# Patient Record
Sex: MALE | Race: WHITE | NOT HISPANIC OR LATINO | URBAN - METROPOLITAN AREA
[De-identification: names, ages, dates, MRNs, and addresses within clinical notes are randomized per-mention and may not be internally consistent; named-entity substitution may affect disease eponyms.]

---

## 2020-05-07 ENCOUNTER — OFFICE VISIT - HEALTHEAST (OUTPATIENT)
Dept: FAMILY MEDICINE | Facility: CLINIC | Age: 30
End: 2020-05-07

## 2020-05-07 DIAGNOSIS — F17.200 NICOTINE DEPENDENCE, UNCOMPLICATED, UNSPECIFIED NICOTINE PRODUCT TYPE: ICD-10-CM

## 2020-05-07 DIAGNOSIS — J02.9 ACUTE PHARYNGITIS, UNSPECIFIED ETIOLOGY: ICD-10-CM

## 2020-05-07 DIAGNOSIS — K08.89 TOOTH PAIN: ICD-10-CM

## 2020-05-07 RX ORDER — VARENICLINE TARTRATE 1 MG/1
TABLET, FILM COATED ORAL
Qty: 60 TABLET | Refills: 2 | Status: SHIPPED | OUTPATIENT
Start: 2020-05-07

## 2021-06-08 NOTE — PROGRESS NOTES
"Trenton Elizabeth is a 30 y.o. male who is being evaluated via a billable telephone visit.      The patient has been notified of following:     \"This telephone visit will be conducted via a call between you and your physician/provider. We have found that certain health care needs can be provided without the need for a physical exam.  This service lets us provide the care you need with a short phone conversation.  If a prescription is necessary we can send it directly to your pharmacy.  If lab work is needed we can place an order for that and you can then stop by our lab to have the test done at a later time.    Telephone visits are billed at different rates depending on your insurance coverage. During this emergency period, for some insurers they may be billed the same as an in-person visit.  Please reach out to your insurance provider with any questions.    If during the course of the call the physician/provider feels a telephone visit is not appropriate, you will not be charged for this service.\"    Patient has given verbal consent to a Telephone visit? Yes    What phone number would you like to be contacted at? [phone]    Patient would like to receive their AVS by AVS Preference: Mail a copy.    Additional provider notes:   Trenton Elizabeth is a 30 y.o. male who presents with chief complaint of swollen glands.  Bilateral neck swollen glands for 2.5 days.  Getting worse?  Had 2 broken teeth, one on each side.  Only one side painful at the moment.  No obvious facial swelling.  Hurts to chew on side of pain.  Cold food/liquids cause pain.  No fevers, no known sick exposures.  He is a smoker, interested in quitting.  Overall toothache for 6 months, getting worse, has not been able to get in to see a dentist.  He can feel his throat glands are swollen when he touches them, can also feel the swelling when he turns his head or moves his neck.  Glands \"feel huge.\"  No trouble swallowing foods.  No current medical " problems, no current prescription medications.  Taking ibuprofen with food, for pain.  History of tonsillectomy.    General: Patient is alert and oriented x 3, in no apparent distress  ENT: normal speaking voice, no hoarseness or muffled sounding voice  Respiratory: no audible wheezing, speaking in normal sentences  Neuro: normal mentation, answering questions appropriately    Assessment/Plan:  1. Acute pharyngitis, unspecified etiology  Differential includes viral pharyngitis, Strep pharyngitis, adenopathy due to tooth infection/issues, versus other.  Discussed treatment options with patient.  No antibiotics in over 2 months.  Prescription sent for Augmentin, which should cover tooth abscess and Strep pharyngitis.  Will continue with symptomatic treatment.  Follow-up as needed.  - amoxicillin-clavulanate (AUGMENTIN) 875-125 mg per tablet; Take 1 tablet by mouth 2 (two) times a day for 10 days.  Dispense: 20 tablet; Refill: 0     2. Tooth pain  Appears to be due to cavity/broken tooth with possible abscess.  Continue ibuprofen with food.  Antibiotic prescribes as above.  Encouraged to follow up with dentist when Covid 19 restrictions lifted.     3. Nicotine dependence, uncomplicated, unspecified nicotine product type  3 minutes spent discussing smoking cessation.  Interested in trying Chantix.  Discussed appropriate use.  If co-pay is too high, he will let us know.  Follow-up as needed.  - varenicline (CHANTIX STARTING MONTH BOX) 0.5 mg (11)- 1 mg (42) tablet; 1 wk before you stop smoking take 0.5mg daily on days 1-3, 0.5mg 2 times each day on days 4-7, then 1mg 2 times daily.  Dispense: 53 tablet; Refill: 0  - varenicline (CHANTIX) 1 mg tablet; Take 1 tab by mouth two times a day. Take after eating with a full glass of water. NOTE: Dispense as maintenance for refills only.  Dispense: 60 tablet; Refill: 2  0     Phone call duration:  10 minutes  9:28 to 9:38    TIFFANY Valera PA-C